# Patient Record
Sex: FEMALE | Race: WHITE | NOT HISPANIC OR LATINO | URBAN - METROPOLITAN AREA
[De-identification: names, ages, dates, MRNs, and addresses within clinical notes are randomized per-mention and may not be internally consistent; named-entity substitution may affect disease eponyms.]

---

## 2017-02-02 ENCOUNTER — *BOTOX/DYSPORT (OUTPATIENT)
Dept: URBAN - METROPOLITAN AREA CLINIC 31 | Facility: CLINIC | Age: 52
Setting detail: DERMATOLOGY
End: 2017-02-02

## 2017-02-02 PROCEDURE — OTHER BOTOX VIAL # (100U): OTHER

## 2017-02-02 PROCEDURE — OTHER BOTOX COSMETIC - 1 AREA *BD*: OTHER

## 2017-02-02 PROCEDURE — VBEAM VBEAM LASER TREATMENT: HCPCS

## 2019-10-14 ENCOUNTER — SEE NOTE (OUTPATIENT)
Dept: URBAN - METROPOLITAN AREA CLINIC 31 | Facility: CLINIC | Age: 54
Setting detail: DERMATOLOGY
End: 2019-10-14

## 2019-10-14 DIAGNOSIS — L30.9 DERMATITIS, UNSPECIFIED: ICD-10-CM

## 2019-10-14 DIAGNOSIS — L40.0 PSORIASIS VULGARIS: ICD-10-CM

## 2019-10-14 PROCEDURE — OTHER COSMETIC CONSULTATION: OTHER

## 2020-01-09 ENCOUNTER — *BOTOX/DYSPORT (OUTPATIENT)
Dept: URBAN - METROPOLITAN AREA CLINIC 31 | Facility: CLINIC | Age: 55
Setting detail: DERMATOLOGY
End: 2020-01-09

## 2020-01-09 DIAGNOSIS — L92.0 GRANULOMA ANNULARE: ICD-10-CM

## 2020-01-09 PROCEDURE — OTHER DYSPORT VIAL #: OTHER

## 2020-01-09 PROCEDURE — OTHER DYSPORT - 1 AREA: OTHER

## 2022-05-10 ENCOUNTER — APPOINTMENT (RX ONLY)
Dept: URBAN - METROPOLITAN AREA CLINIC 12 | Facility: CLINIC | Age: 57
Setting detail: DERMATOLOGY
End: 2022-05-10

## 2022-05-10 DIAGNOSIS — Z41.9 ENCOUNTER FOR PROCEDURE FOR PURPOSES OTHER THAN REMEDYING HEALTH STATE, UNSPECIFIED: ICD-10-CM

## 2022-05-10 PROCEDURE — ? DYSPORT

## 2022-05-10 PROCEDURE — ? COSMETIC CONSULTATION - MICRO-NEEDLING

## 2022-05-10 PROCEDURE — ? COSMETIC CONSULTATION: COOLPEEL

## 2022-05-10 PROCEDURE — ? RECOMMENDATIONS

## 2022-05-10 NOTE — PROCEDURE: DYSPORT
Glabellar Complex Units: 20
Lot #: [id number]
Summary Price $ (Use Numbers Only, No Text Please.): 525
Detail Level: Detailed
Forehead Units: 5
Postcare Instructions: Patient instructed to not lie down for 4 hours and limit physical activity for 24 hours. Patient instructed not to travel by airplane for 48 hours.
Show Price In Note?: yes
Bill Summary Price Listed Below, Or Bill Total Of Units X Price Per Unit?: Bill Summary Price Below
Additional Area 1 Units: 0
Price Per Unit In $ (Use Numbers Only, No Text Please.): 15
Periorbital Skin Units: 10
Consent: Written consent was obtained prior to the procedure. Risks, benefits, expectations and alternatives were discussed including, but not limited to, infection, bleeding, lid/brow ptosis, bruising, swelling, diplopia, temporary effects, incomplete chemical denervation and dissatisfaction with the cosmetic outcome. No guarantee or warranty was given or implied regarding longevity of results.
Use Map Statement For Sites (Optional): No
Expiration Date (Month Year): 12/31/22
Map Statment: See attached map for complete details

## 2022-05-10 NOTE — PROCEDURE: RECOMMENDATIONS
Recommendation Preamble: The following recommendations were made during the visit:
Detail Level: Zone
Render Risk Assessment In Note?: no
Recommendations (Free Text): Revision [employer] Line Relaxer, [employer]ell[employer] TruPhysical or Original

## 2025-05-19 ENCOUNTER — APPOINTMENT (OUTPATIENT)
Dept: URBAN - METROPOLITAN AREA CLINIC 12 | Facility: CLINIC | Age: 60
Setting detail: DERMATOLOGY
End: 2025-05-19

## 2025-05-19 DIAGNOSIS — Z41.1 ENCOUNTER FOR COSMETIC SURGERY: ICD-10-CM

## 2025-05-19 PROCEDURE — ? CONSULTATION - AGING FACE

## 2025-05-19 PROCEDURE — ? PATIENT SPECIFIC COUNSELING

## 2025-05-19 ASSESSMENT — LOCATION ZONE DERM
LOCATION ZONE: FACE
LOCATION ZONE: FACE

## 2025-05-19 ASSESSMENT — LOCATION SIMPLE DESCRIPTION DERM
LOCATION SIMPLE: SUBMENTAL CHIN
LOCATION SIMPLE: SUBMENTAL CHIN

## 2025-05-19 ASSESSMENT — LOCATION DETAILED DESCRIPTION DERM
LOCATION DETAILED: SUBMENTAL CHIN
LOCATION DETAILED: SUBMENTAL CHIN

## 2025-05-19 NOTE — PROCEDURE: PATIENT SPECIFIC COUNSELING
Other (Free Text): Pt presents to consult with Dr. Cevallos about her neck. Pt voices her chief complaint is the “turkey gobbler” appearance of her neck. \\n\\Gabriel Cevallos examines the pt and states: \\n-it it’s important that the neck blends in well with the pt’s jawline \\n-pt would benefit from a lower face and neck lift\\n-the procedure will be done in the OR and pt will stay overnight \\n-the process, aftercare, and recovery of the surgery is explained to the pt\\n-later on down the line, pt will most likely want to have surgery on her eyes as well\\n-Dr. Cevallos recommends doing a fat transfer to plump her cheeks\\n-pt does not need to do it all at the same time (eyes and face) but she can if she would like \\n\\nPt verbalized understanding.
Detail Level: Simple